# Patient Record
Sex: FEMALE | Race: WHITE | NOT HISPANIC OR LATINO | Employment: UNEMPLOYED | URBAN - METROPOLITAN AREA
[De-identification: names, ages, dates, MRNs, and addresses within clinical notes are randomized per-mention and may not be internally consistent; named-entity substitution may affect disease eponyms.]

---

## 2017-05-18 ENCOUNTER — OFFICE VISIT (OUTPATIENT)
Dept: NEUROLOGY | Facility: MEDICAL CENTER | Age: 57
End: 2017-05-18
Payer: MEDICAID

## 2017-05-18 VITALS
WEIGHT: 120 LBS | OXYGEN SATURATION: 98 % | BODY MASS INDEX: 20.49 KG/M2 | HEIGHT: 64 IN | RESPIRATION RATE: 16 BRPM | DIASTOLIC BLOOD PRESSURE: 82 MMHG | TEMPERATURE: 99 F | HEART RATE: 63 BPM | SYSTOLIC BLOOD PRESSURE: 120 MMHG

## 2017-05-18 DIAGNOSIS — R20.2 PARESTHESIAS: Primary | ICD-10-CM

## 2017-05-18 PROCEDURE — 99205 OFFICE O/P NEW HI 60 MIN: CPT | Performed by: PSYCHIATRY & NEUROLOGY

## 2017-05-18 RX ORDER — DICYCLOMINE HYDROCHLORIDE 10 MG/1
10 CAPSULE ORAL
COMMUNITY
End: 2017-07-07

## 2017-05-18 RX ORDER — PREGABALIN 50 MG/1
50 CAPSULE ORAL 3 TIMES DAILY
COMMUNITY
End: 2017-05-18

## 2017-05-18 RX ORDER — PANTOPRAZOLE SODIUM 40 MG/1
40 TABLET, DELAYED RELEASE ORAL DAILY
COMMUNITY
End: 2017-07-07

## 2017-05-18 RX ORDER — BACLOFEN 10 MG/1
10 TABLET ORAL 3 TIMES DAILY
COMMUNITY
End: 2017-07-07

## 2017-05-18 ASSESSMENT — ENCOUNTER SYMPTOMS
WEAKNESS: 1
BACK PAIN: 1
TINGLING: 1
SENSORY CHANGE: 1
FOCAL WEAKNESS: 0
TREMORS: 1
HEADACHES: 0
NECK PAIN: 1
CONSTIPATION: 0
MYALGIAS: 0

## 2017-05-18 NOTE — MR AVS SNAPSHOT
"        Nadine Eckert   2017 8:40 AM   Office Visit   MRN: [id number]    Department:  [facility]   Dept Phone:  [phone]    Description:  Female : 1960   Provider:  Anthony Tyler M.D.           Reason for Visit     New Patient New patient. Ref by [facility]. Pt complains of light dizziness, rapid weight loss, body aches, numbness and tingling in bilateral extremities. Exposure to pesticide chemicals at work. Pt states that she smokes marijuana to alleviate pain.      Allergies as of 2017     Allergen Noted Reactions    Asa [Aspirin] 2017   Nausea      You were diagnosed with     Paresthesias   [294509]  -  Primary       Vital Signs     Blood Pressure Pulse Temperature Respirations Height Weight    120/82 mmHg 63 37.2 °C (99 °F) 16 1.626 m (5' 4\") 54.432 kg (120 lb)    Body Mass Index Oxygen Saturation                20.59 kg/m2 98%          Basic Information     Date Of Birth Sex Race Ethnicity Preferred Language    1960 Female White Non- English      Problem List              ICD-10-CM Priority Class Noted - Resolved    Paresthesias R20.2   2017 - Present      Health Maintenance     Patient has no pending health maintenance at this time      Current Immunizations     No immunizations on file.      Below and/or attached are the medications your provider expects you to take. Review all of your home medications and newly ordered medications with your provider and/or pharmacist. Follow medication instructions as directed by your provider and/or pharmacist. Please keep your medication list with you and share with your provider. Update the information when medications are discontinued, doses are changed, or new medications (including over-the-counter products) are added; and carry medication information at all times in the event of emergency situations     Allergies:  ASA - Nausea               Medications  Valid as of: May 18, 2017 -  9:59 AM   " Generic Name Brand Name Tablet Size Instructions for use    Baclofen (Tab) LIORESAL 10 MG Take 10 mg by mouth 3 times a day.        Dicyclomine HCl (Cap) BENTYL 10 MG Take 10 mg by mouth 4 Times a Day,Before Meals and at Bedtime.        LamoTRIgine (Kit) LamoTRIgine 50 & 100 & 200 MG Take 1 Tab by mouth every day. Take as directed in titration kit, one tab daily, but increasing the dose every week.        Pantoprazole Sodium (Tablet Delayed Response) PROTONIX 40 MG Take 40 mg by mouth every day.        .                 Medicines prescribed today were sent to:     [id number] - [place], [place] - [address]    [address] [place] [address]    Phone: [phone] Fax: [phone]    Open 24 Hours?: No      Medication refill instructions:       If your prescription bottle indicates you have medication refills left, it is not necessary to call your provider’s office. Please contact your pharmacy and they will refill your medication.    If your prescription bottle indicates you do not have any refills left, you may request refills at any time through one of the following ways: The online [employer] system (except Urgent Care), by calling your provider’s office, or by asking your pharmacy to contact your provider’s office with a refill request. Medication refills are processed only during regular business hours and may not be available until the next business day. Your provider may request additional information or to have a follow-up visit with you prior to refilling your medication.   *Please Note: Medication refills are assigned a new Rx number when refilled electronically. Your pharmacy may indicate that no refills were authorized even though a new prescription for the same medication is available at the pharmacy. Please request the medicine by name with the pharmacy before contacting your provider for a refill.        Your To Do List     Future Labs/Procedures Complete By Expires    CRYOGLOBULIN QL SERUM RFLX   As directed 5/18/2018    FOLATE  As directed 5/18/2018    HEAVY METALS BLOOD  As directed 5/18/2018    SPEP W/REFLEX TO HOA, A, G, M  As directed 5/18/2018    TSH  As directed 5/18/2018    VITAMIN B12  As directed 5/18/2018    WESTERGREN SED RATE  As directed 11/16/2017      Referral     A referral request has been sent to our patient care coordination department. Please allow 3-5 business days for us to process this request and contact you either by phone or mail. If you do not hear from us by the 5th business day, please call us at [phone].           MyChart Status: Patient Declined

## 2017-05-19 NOTE — PROGRESS NOTES
Subjective:      Nadine Eckert is a 56 y.o. female who presents from the office of Natalie Starkey PA-C, for consultation, with a history of bilateral paresthesias, numbness and painful dysesthesias.     HPI    Ms. Eckert is a pleasant 56-year-old right-handed  female who first began to suffer from very painful dysesthesias around the mid thoracic region radiating around the left flank under the rib cage. Starting about 2 years ago, there was no inciting event, she denied any rash in the associated area. With time the pain would then begin to radiate up the thoracic spinal cord about several inches. It was constant, skin hypersensitivity began to develop as well. There were no symptoms in a mirror-type distribution on the right side. At about the time the symptoms started, she was hospitalized, a pacemaker was placed because of sinus bradycardia, so MRI imaging could never be done. More recently CT scan of the spine was done, evidently it revealed minimal degenerative arthritis only. She was placed on Lyrica but this has caused significant side effects, and at the present dosing there has been no improvement with her pain and skin hypersensitivity.    During this time she had also begun to suffer from numbness and tingling sensations which started in the feet, and which have subsequently ascended. These were very mild, there was no change in her back and flank pain symptoms, they did not seem to fluctuate depending on weight bearing, sedentary or supine position, and there was never weakness. She denied allodynia or lower extremity edema. The symptoms were so mild she actually was able to function without real discomfort and she paid the symptoms little attention. About 5-6 months ago, for unclear reasons, the symptoms did become more noticeable, and since then they have ascended, now up to the hips, and though she denies any perineal or perianal sensory changes, there has been no lower back pain with  radicular, radiating symptoms distally. Bladder function are stable. Again the symptoms are constant, they can be quite unpleasant, she denies any cramping sensations or actual weakness. Balance has become quite problematic as a result. She denies any changes with the instability depending on ambient light or lack thereof, walking uneven surfaces is no worse than smooth surfaces. There is no loss of sensation in the feet. The hands became involved eventually, again she describes a constant numbness in a glove-distribution to the wrists. There has been loss of dexterity, but there was no loss of sensation. The symptoms in the hands are not worsened upon awakening, they do not seem to worsen if she uses the hands.    About one month prior to onset of the left back and flank pain symptoms, she had had about one month of exposure to several organic solvents in a very enclosed space. These exposures whenever repeated.    She does describe tremor that exists in the head and neck, she often has an internalized sense of tremulousness though there has been no involvement of the voice. She notes this when she is seated or standing erect. The hands are not involved. This predated all of the sensory symptoms.    She has a history of plaque psoriasis, no history of CVA, CAD, PVD, MS, neurodegenerative disease, neuropathic disease, malignancy, thyroid disease, diabetes, autoimmune disease, liver or kidney disease or blood dyscrasia. She did get chickenpox as a child, she has not received the immunization. She had a pacemaker placed 2 years ago for sinus bradycardia. Her mother had heart disease and hypertension, her father is still alive and well, she has 6 siblings, one brother with diabetes, alcohol abuse and stroke, another brother passed away from cancer. She is not aware of anyone in the family with a history of similar symptoms, MS or neurodegenerative disease. She has a history of heavy alcohol overuse, she has been clean  "for 16 years, she continues to smoke regularly.    She is presently on Lyrica, Protonix and baclofen.    Review of Systems   Constitutional: Positive for malaise/fatigue.   Gastrointestinal: Negative for constipation.   Genitourinary: Negative for urgency and frequency.   Musculoskeletal: Positive for back pain and neck pain. Negative for myalgias.   Neurological: Positive for tingling, tremors, sensory change and weakness. Negative for focal weakness and headaches.        Objective:     /82 mmHg  Pulse 63  Temp(Src) 37.2 °C (99 °F)  Resp 16  Ht 1.626 m (5' 4\")  Wt 54.432 kg (120 lb)  BMI 20.59 kg/m2  SpO2 98%     Physical Exam    She appears in no acute distress. Her vital signs are stable. There is no malar rash, jaw or temporal tenderness, or jaw claudication. Her neck is supple, range of motion is full, carotid pulses are present bilaterally without asymmetry or bruits. Lhermitte phenomena is absent, compression maneuvers are negative. There is no spasm or tenderness of the cervical paraspinal muscle bodies. Spinous processes are nontender. Cardiac evaluation reveals a regular rhythm. Straight leg raising is negative bilaterally. There is some darkened, reddened discoloration in the distal lower extremities, distal pulses are present but diminished. There is markedly tenderness of the elbows medially and at the wrists, all bilateral. Compression maneuvers at these points elicited significant and distal radiating paresthesias in both ulnar and median nerve distributions, respectively. Phalen sign is present bilaterally. Around the thorax, there is no evidence of skin change in color, texture or turgor.    Mentation is intact. There is no evidence of aphasia, apraxia, inattention, she is fully oriented.    PERRLA/EOMI, visual fields are full, there is no facial asymmetry, sensory loss, or bulbar dysfunction. The tongue and uvular midline, jaw jerk is absent, shoulder shrug is symmetric. There is a " fine side to side tremulousness of the head when she is otherwise distracted and sitting erect. There is no involvement of the voice.    Motor exam reveals normal tone throughout, there is no tremulousness of the hands either at rest or with sustained posture. Strength is fully intact in all 4 extremities. There is no evidence of atrophy. Reflexes are easily elicited throughout, the right ankle jerk is absent when compared to the left which is easily elicited.    She walks with an antalgic quality, station is not widened. She can get up on her heels and toes but walking is limited because of pain. Repetitive movements with the feet are slowed but symmetric. These are intact with the hands. There is no appendicular dystaxia throughout.    Sensory exam does reveal a mild stocking loss of vibration to the ankles, pinprick temperature and joint position sense are intact throughout. Around the left flank there is no real loss of pin, temperature or light touch perception on the left side in the areas described by the patient where her pain is located.     Assessment/Plan:     1. Paresthesias  She describes a complex collection of locations were paresthesias. I suspect the thoracic region is an amyotrophy possibly related to a varicella-zoster without rash. Unfortunately, MRI cannot be done because of her pacemaker. She is not overtly myelopathic on exam. CT imaging of the spine evidently was unremarkable but again this modality is very insensitive. Evidently, there is no indication of some more significant infiltrative process.    The paresthesias in the extremities suggest a small fiber, painful sensory neuropathic process, and though she has a history of alcohol overuse, she quit more than a decade prior to the symptoms starting. She lacks a history of typical risk otherwise. Whatever these organic solvents were that she was exposed to, it's possible they may be at the root cause, but neurophysiologic studies should be  done for thoroughness. Blood work as well should be done to rule out other, medical causes of a painful sensory neuropathy. This is not likely hereditary absent any family history.    Interestingly, she has exquisite tenderness at the elbows and wrists, and I suspect we are also dealing with compression neuropathies in the upper extremities, and these may be the real reason for the hand symptoms. In the legs, she has a dropped ankle jerk, so I suspect we are seeing a radiculopathy on the right side, possibly S1 level. Still, this won't explain the left leg symptoms nor the distribution of the sensory distortions overall. Obviously a peripheral polyneuropathy will predispose to compression neuropathic processes.    Face-to-face time was spent reviewing all of the above with the patient. Rationale for blood work, neurophysiologic studies being done, etc. was reviewed. Lyrica will be discontinued, Lamictal ER titration will be started beginning at 50 mg daily for one week, then 100 mg daily, eventually 200 mg daily. Side effects were reviewed. Referral to hand therapy for splinting and reinjury prevention also is being set up. Follow-up will be scheduled in the next several months.    - LamoTRIgine 50 & 100 & 200 MG Kit; Take 1 Tab by mouth every day. Take as directed in titration kit, one tab daily, but increasing the dose every week.  Dispense: 1 Kit; Refill: 0  - WESTERGREN SED RATE; Future  - C-REACTIVE PROTEIN CARDIAC  - FOLATE; Future  - VITAMIN B12; Future  - SPEP W/REFLEX TO HOA, A, G, M; Future  - HEAVY METALS BLOOD; Future  - CRYOGLOBULIN QL SERUM RFLX; Future  - REFERRAL TO NEURODIAGNOSTICS (EEG,EP,EMG/NCS/DBS) Modality Requested: EMG/NCS-Comment Extremities  - TSH; Future  - REFERRAL TO OCCUPATIONAL THERAPY Reason for Therapy: Eval/Treat/Report    2. Essential tremor  An incidental issue, this was brought up as part of the full evaluation. This is independent of the neuropathy itself, if one exists,  certainly not related to any type of exposure that she may have suffered from. It is mild enough that we simply can observe. We spent some time talking about this condition as well, and how it differs from Parkinson's disease.    Time: Evaluation 60 minutes for exam come review, discussion, and education  Discussion: As mentioned in the assessment, over 50% of the time spent face-to-face counseling and coordinating care

## 2017-05-23 LAB — CRP SERPL HS-MCNC: 0.32 MG/L (ref 0–3)

## 2017-05-29 LAB
ALBUMIN SERPL ELPH-MCNC: 3.8 G/DL (ref 2.9–4.4)
ALBUMIN/GLOB SERPL: 1.4 {RATIO} (ref 0.7–1.7)
ALPHA1 GLOB SERPL ELPH-MCNC: 0.3 G/DL (ref 0–0.4)
ALPHA2 GLOB SERPL ELPH-MCNC: 0.9 G/DL (ref 0.4–1)
ARSENIC BLD-MCNC: 7 UG/L (ref 2–23)
B-GLOBULIN SERPL ELPH-MCNC: 1 G/DL (ref 0.7–1.3)
CRYOGLOB SER QL 1D COLD INC: NORMAL
ERYTHROCYTE [SEDIMENTATION RATE] IN BLOOD BY WESTERGREN METHOD: 3 MM/HR (ref 0–40)
FOLATE SERPL-MCNC: 11.5 NG/ML
GAMMA GLOB SERPL ELPH-MCNC: 0.6 G/DL (ref 0.4–1.8)
GLOBULIN SER CALC-MCNC: 2.8 G/DL (ref 2.2–3.9)
LEAD BLD-MCNC: 2 UG/DL (ref 0–19)
M PROTEIN SERPL ELPH-MCNC: NORMAL G/DL
MERCURY BLD-MCNC: NORMAL UG/L (ref 0–14.9)
PLEASE NOTE 011150: NORMAL
PROT SERPL-MCNC: 6.6 G/DL (ref 6–8.5)
TSH SERPL DL<=0.005 MIU/L-ACNC: 1.23 UIU/ML (ref 0.45–4.5)
VIT B12 SERPL-MCNC: 531 PG/ML (ref 211–946)

## 2017-06-14 ENCOUNTER — TELEPHONE (OUTPATIENT)
Dept: NEUROLOGY | Facility: MEDICAL CENTER | Age: 57
End: 2017-06-14

## 2017-06-15 NOTE — TELEPHONE ENCOUNTER
Pt called today to state that she is feeling better, but is now experiencing severe cramping and left sided back pain. Please advise. Thanks!  - CR      508pm

## 2017-06-22 ENCOUNTER — TELEPHONE (OUTPATIENT)
Dept: NEUROLOGY | Facility: MEDICAL CENTER | Age: 57
End: 2017-06-22

## 2017-06-22 NOTE — TELEPHONE ENCOUNTER
Pt is sending this message via [employer] e-mail. Please advise. Thank you.    Non-Urgent Medical Question        From     Nadine Eckert      To     [facility]      Sent     6/22/2017  2:50 PM            My 7 year old granddaughter is at Urgent Care covered in Chicken Pox. Did I cause this. ? Am I contagious? I'm around children and senior a lot.Please let me know.Thank You so much.

## 2017-06-26 NOTE — TELEPHONE ENCOUNTER
Message  Received: 4 days ago       Anthony Tyler M.D.  Carlee Barcenas, [facility]       Caller: Unspecified (4 days ago, 4:12 PM)                     Please tell the patient she did not give her granddaughter chickenpox. In fact, I would be more concerned that her granddaughter would give it to her, especially if she has not had any exposure or infection when she was a child.

## 2017-07-07 ENCOUNTER — OFFICE VISIT (OUTPATIENT)
Dept: NEUROLOGY | Facility: MEDICAL CENTER | Age: 57
End: 2017-07-07
Payer: MEDICAID

## 2017-07-07 VITALS
DIASTOLIC BLOOD PRESSURE: 80 MMHG | SYSTOLIC BLOOD PRESSURE: 118 MMHG | BODY MASS INDEX: 19.67 KG/M2 | RESPIRATION RATE: 16 BRPM | WEIGHT: 115.2 LBS | TEMPERATURE: 97.8 F | HEART RATE: 60 BPM | OXYGEN SATURATION: 96 % | HEIGHT: 64 IN

## 2017-07-07 DIAGNOSIS — R20.2 PARESTHESIAS: ICD-10-CM

## 2017-07-07 PROCEDURE — 99214 OFFICE O/P EST MOD 30 MIN: CPT | Performed by: PSYCHIATRY & NEUROLOGY

## 2017-07-07 RX ORDER — LAMOTRIGINE 50 MG/1
TABLET, EXTENDED RELEASE ORAL
COMMUNITY
Start: 2017-05-19 | End: 2017-07-07

## 2017-07-07 RX ORDER — LAMOTRIGINE 200 MG/1
200 TABLET, EXTENDED RELEASE ORAL DAILY
Qty: 30 TAB | Refills: 6 | Status: SHIPPED | OUTPATIENT
Start: 2017-07-07 | End: 2017-09-20 | Stop reason: SDUPTHER

## 2017-07-07 ASSESSMENT — ENCOUNTER SYMPTOMS
HEADACHES: 0
TINGLING: 1
FALLS: 0
LOSS OF CONSCIOUSNESS: 0
SENSORY CHANGE: 1
INSOMNIA: 1
MEMORY LOSS: 0
SEIZURES: 0

## 2017-07-07 ASSESSMENT — PATIENT HEALTH QUESTIONNAIRE - PHQ9: CLINICAL INTERPRETATION OF PHQ2 SCORE: 0

## 2017-07-07 NOTE — MR AVS SNAPSHOT
"        Nadine Eckert   2017 4:00 PM   Office Visit   MRN: [id number]    Department:  [facility]   Dept Phone:  [phone]    Description:  Female : 1960   Provider:  Anthony Tyler M.D.           Reason for Visit     Follow-Up Parethesias      Allergies as of 2017     Allergen Noted Reactions    Asa [Aspirin] 2017   Nausea      You were diagnosed with     Paresthesias   [239253]         Vital Signs     Blood Pressure Pulse Temperature Respirations Height Weight    118/80 mmHg 60 36.6 °C (97.8 °F) 16 1.626 m (5' 4\") 52.254 kg (115 lb 3.2 oz)    Body Mass Index Oxygen Saturation Smoking Status             19.76 kg/m2 96% Current Every Day Smoker         Basic Information     Date Of Birth Sex Race Ethnicity Preferred Language    1960 Female White Non- English      Your appointments     2017  9:30 AM   ELECTROMYOGRAPHY with NEURODIAGNOSTIC EMG LAB   [facility] (--)    [address], [address] [id number] [phone]            Oct 25, 2017  9:40 AM   Follow Up Visit with Anthony Tyler M.D.   [facility] (--)    [address], [address] [id number] [phone]           You will be receiving a confirmation call a few days before your appointment from our automated call confirmation system.              Problem List              ICD-10-CM Priority Class Noted - Resolved    Paresthesias R20.2   2017 - Present      Health Maintenance        Date Due Completion Dates    IMM DTaP/Tdap/Td Vaccine (1 - Tdap) 10/23/1979 ---    PAP SMEAR 10/23/1981 ---    MAMMOGRAM 10/23/2000 ---    COLONOSCOPY 10/23/2010 ---    IMM INFLUENZA (1) 2017 ---            Current Immunizations     No immunizations on file.      Below and/or attached are the medications your provider expects you to take. Review all of your home medications and newly ordered medications with your provider and/or pharmacist. Follow " medication instructions as directed by your provider and/or pharmacist. Please keep your medication list with you and share with your provider. Update the information when medications are discontinued, doses are changed, or new medications (including over-the-counter products) are added; and carry medication information at all times in the event of emergency situations     Allergies:  ASA - Nausea               Medications  Valid as of: July 07, 2017 -  4:32 PM    Generic Name Brand Name Tablet Size Instructions for use    Baclofen (Tab) LIORESAL 10 MG Take 10 mg by mouth 3 times a day.        Dicyclomine HCl (Cap) BENTYL 10 MG Take 10 mg by mouth 4 Times a Day,Before Meals and at Bedtime.        LamoTRIgine (Kit) LamoTRIgine 50 & 100 & 200 MG Take 1 Tab by mouth every day. Take as directed in titration kit, one tab daily, but increasing the dose every week.        LamoTRIgine (TABLET SR 24 HR) LamoTRIgine 50 MG         LamoTRIgine (TABLET SR 24 HR) LamoTRIgine 200 MG Take 200 mg by mouth every day.        Pantoprazole Sodium (Tablet Delayed Response) PROTONIX 40 MG Take 40 mg by mouth every day.        Pregabalin (Cap) LYRICA 50 MG         .                 Medicines prescribed today were sent to:     [id number] - [place], [place] - [address]    [address] [facility] [address]    Phone: [phone] Fax: [phone]    Open 24 Hours?: No      Medication refill instructions:       If your prescription bottle indicates you have medication refills left, it is not necessary to call your provider’s office. Please contact your pharmacy and they will refill your medication.    If your prescription bottle indicates you do not have any refills left, you may request refills at any time through one of the following ways: The online [employer] system (except Urgent Care), by calling your provider’s office, or by asking your pharmacy to contact your provider’s office with a refill request. Medication refills are  processed only during regular business hours and may not be available until the next business day. Your provider may request additional information or to have a follow-up visit with you prior to refilling your medication.   *Please Note: Medication refills are assigned a new Rx number when refilled electronically. Your pharmacy may indicate that no refills were authorized even though a new prescription for the same medication is available at the pharmacy. Please request the medicine by name with the pharmacy before contacting your provider for a refill.           MyChart Access Code: Activation code not generated  Current MyChart Status: Active          Quit Tobacco Information     Do you want to quit using tobacco?    Quitting tobacco decreases risks of cancer, heart and lung disease, increases life expectancy, improves sense of taste and smell, and increases spending money, among other benefits.    If you are thinking about quitting, we can help.  • [facility] Quit Tobacco Program: 256.516.1626  o Program occurs weekly for four weeks and includes pharmacist consultation on products to support quitting smoking or chewing tobacco. A provider referral is needed for pharmacist consultation.  • Tobacco Users Help Hotline: 3-249-QUIT-NOW (884-5158) or https://[place].quitlogix.org/  o Free, confidential telephone and online coaching for [place] residents. Sessions are designed on a schedule that is convenient for you. Eligible clients receive free nicotine replacement therapy.  • Nationally: www.smokefree.gov  o Information and professional assistance to support both immediate and long-term needs as you become, and remain, a non-smoker. Smokefree.gov allows you to choose the help that best fits your needs.

## 2017-07-07 NOTE — PROGRESS NOTES
"Subjective:      Nadine Eckert is a 56 y.o. female who presents with her  Livan, for follow-up, with a history of painful dysesthesias, now it seems to be increasing both in intensity and geographic distribution.    HPI    Since last seen, the dysesthesias she had around the left flank have continued, she now has more of them in the lower extremities, ascending further, up the thoracic cage itself. Even the hands can become painful. With all this pain, she is not sleeping well, she is noticing generalized weakness and deconditioning, she is more tremulous, cognition is slowed. Serologies for autoimmune disease, thyroid dysfunction, B-12 and folate deficiencies, etc., were all normal/negative. EMG studies are scheduled in 2 weeks. Symptomatically, Lamictal  mg daily was well tolerated and provided very good symptomatic relief, unfortunately when she ran out of the medication, she never call the office to update. Needless to say, everything got worse, returning back to baseline.    Medical, surgical and family history is reviewed in the electronic health record, there are no new drug allergies. She has stopped her Protonix, Bentyl and baclofen.    Review of Systems   Constitutional: Positive for malaise/fatigue.   Musculoskeletal: Negative for falls.   Neurological: Positive for tingling and sensory change. Negative for seizures, loss of consciousness and headaches.   Psychiatric/Behavioral: Negative for memory loss. The patient has insomnia.    All other systems reviewed and are negative.       Objective:     /80 mmHg  Pulse 60  Temp(Src) 36.6 °C (97.8 °F)  Resp 16  Ht 1.626 m (5' 4\")  Wt 52.254 kg (115 lb 3.2 oz)  BMI 19.76 kg/m2  SpO2 96%     Physical Exam    She appears in some mild distress, especially when she is asked to move. Vital signs are stable. There is no malar rash, jaw temporal tenderness, or jaw claudication. The neck is supple, range of motion is full. Straight leg " "raising is negative bilaterally. There is no evidence of diffuse rash, bruising, or edema.    Full joints, there is no aphasia. PERRLA/EOMI, visual fields are full, there is no facial asymmetry, sensory loss, or bulbar dysfunction. The tongue and uvula are midline. Motor exam is intact, tone is normal throughout, strength is symmetric, reflexes are present at all points including the ankles bilaterally, both toes are downgoing. Repetitive movements are intact, there is no appendicular dystaxia. She walks with a cane, it is seemingly quite painful to do so. Sensory exam is intact to vibration and temperature.     Assessment/Plan:     1. Paresthesias  I'm not clear whether we will actually ever find a cause for the diffuse pain she is suffering from, but I suspect this may simply be a chronic pain syndrome such as fibromyalgia that is developing. Whether or not things were triggered by a thoracic root amyotrophy will never be known, but they were reassured that she does not have \"systemic chickenpox\" causing all of her pain symptoms. The differences between shingles and disseminated varicella-zoster were reviewed in full. She was reassured that what her condition is, as painful as it is, is still more benign and not necessarily progressive. It is certainly amenable to treatment, speaking of which, we will reinitiate lamotrigine  mg daily and she will continue that for the foreseeable future. We will follow-up after the neurophysiologic studies done. She was told that so far most of the more malignant and organic illnesses that can result in her symptoms, but also more likely to be associated with permanent deficits, have for the most part been ruled out.    - LamoTRIgine 200 MG TABLET SR 24 HR; Take 200 mg by mouth every day.  Dispense: 30 Tab; Refill: 6    Time: Evaluation of 25 minutes for exam come review, discussion, and education  Discussion: As mentioned in assessment, over 50% of the time spent " face-to-face counseling and coordination in care

## 2017-07-21 ENCOUNTER — NON-PROVIDER VISIT (OUTPATIENT)
Dept: NEUROLOGY | Facility: MEDICAL CENTER | Age: 57
End: 2017-07-21
Payer: MEDICAID

## 2017-07-21 DIAGNOSIS — R20.2 PARESTHESIAS: ICD-10-CM

## 2017-07-21 PROCEDURE — 95910 NRV CNDJ TEST 7-8 STUDIES: CPT

## 2017-07-21 PROCEDURE — 95886 MUSC TEST DONE W/N TEST COMP: CPT

## 2017-07-21 PROCEDURE — 95885 MUSC TST DONE W/NERV TST LIM: CPT | Mod: 59

## 2017-07-21 NOTE — PROGRESS NOTES
EMG and nerve conduction studies were requested in both upper extremities and lower extremity for paresthesias in the upper extremity and paresthesias in the lower extremity with occasional cramping pain in the lower extremities. The full report was scanned into the Epic system where the waveforms are stored. In the upper extremities and the median and ulnar conduction study on the right and the median and ulnar conduction study on the left were entirely normal normal distal latencies and no evidence of entrapment at the wrist or at the elbow. In the left lower extremity left sural conduction study is normal left common peroneal and left tibial conduction studies are normal. Needle exam was done of the right upper extremity selected muscles in the left lower extremity. In the right upper extremity pronator teres biceps triceps deltoid and the abductor digiti minimi had normal insertional activity and normal motor units. In the right lower extremity abductor hallucis anterior tib and gastroc were normal in terms of insertional activity and normal motor units.  Impression    This is essentially a normal nerve conduction study in the upper and lower extremities without evidence of neuropathy and no evidence of radiculopathy in the upper extremities. Clinical correlation suggested.

## 2017-07-21 NOTE — MR AVS SNAPSHOT
Nadine MEEK Babar   2017 9:30 AM   Non-Provider Visit   MRN: [id number]    Department:  [facility]   Dept Phone:  [phone]    Description:  Female : 1960   Provider:  [facility]           Reason for Visit     Procedure           Allergies as of 2017     Allergen Noted Reactions    Asa [Aspirin] 2017   Nausea      You were diagnosed with     Paresthesias   [[id number]]         Vital Signs     Smoking Status                   Current Every Day Smoker           Basic Information     Date Of Birth Sex Race Ethnicity Preferred Language    1960 Female White Non- English      Your appointments     Oct 25, 2017  9:40 AM   Follow Up Visit with Anthony Tyler M.D.   [facility] (--)    [address], [address]  [facility] [address] [phone]           You will be receiving a confirmation call a few days before your appointment from our automated call confirmation system.              Problem List              ICD-10-CM Priority Class Noted - Resolved    Paresthesias R20.2   2017 - Present      Health Maintenance        Date Due Completion Dates    IMM DTaP/Tdap/Td Vaccine (1 - Tdap) 10/23/1979 ---    PAP SMEAR 10/23/1981 ---    MAMMOGRAM 10/23/2000 ---    COLONOSCOPY 10/23/2010 ---    IMM INFLUENZA (1) 2017 ---            Current Immunizations     No immunizations on file.      Below and/or attached are the medications your provider expects you to take. Review all of your home medications and newly ordered medications with your provider and/or pharmacist. Follow medication instructions as directed by your provider and/or pharmacist. Please keep your medication list with you and share with your provider. Update the information when medications are discontinued, doses are changed, or new medications (including over-the-counter products) are added; and carry medication information at all times in the event of emergency  situations     Allergies:  ASA - Nausea               Medications  Valid as of: July 21, 2017 - 12:41 PM    Generic Name Brand Name Tablet Size Instructions for use    LamoTRIgine (TABLET SR 24 HR) LamoTRIgine 200 MG Take 200 mg by mouth every day.        .                 Medicines prescribed today were sent to:     [facility] #[id number] - [place] - [address]    [address] [place] [address]    Phone: [phone] Fax: [phone]    Open 24 Hours?: No      Medication refill instructions:       If your prescription bottle indicates you have medication refills left, it is not necessary to call your provider’s office. Please contact your pharmacy and they will refill your medication.    If your prescription bottle indicates you do not have any refills left, you may request refills at any time through one of the following ways: The online Hubblr system (except Urgent Care), by calling your provider’s office, or by asking your pharmacy to contact your provider’s office with a refill request. Medication refills are processed only during regular business hours and may not be available until the next business day. Your provider may request additional information or to have a follow-up visit with you prior to refilling your medication.   *Please Note: Medication refills are assigned a new Rx number when refilled electronically. Your pharmacy may indicate that no refills were authorized even though a new prescription for the same medication is available at the pharmacy. Please request the medicine by name with the pharmacy before contacting your provider for a refill.           Hubblr Access Code: Activation code not generated  Current Hubblr Status: Active          Quit Tobacco Information     Do you want to quit using tobacco?    Quitting tobacco decreases risks of cancer, heart and lung disease, increases life expectancy, improves sense of taste and smell, and increases spending money, among other  benefits.    If you are thinking about quitting, we can help.  • [facility] Quit Tobacco Program: 318-049-3925  o Program occurs weekly for four weeks and includes pharmacist consultation on products to support quitting smoking or chewing tobacco. A provider referral is needed for pharmacist consultation.  • Tobacco Users Help Hotline: 1-800-QUIT-NOW (121-6820) or https://[place].quitlogix.org/  o Free, confidential telephone and online coaching for [place] residents. Sessions are designed on a schedule that is convenient for you. Eligible clients receive free nicotine replacement therapy.  • Nationally: www.smokefree.gov  o Information and professional assistance to support both immediate and long-term needs as you become, and remain, a non-smoker. Smokefree.gov allows you to choose the help that best fits your needs.

## 2017-09-20 DIAGNOSIS — R20.2 PARESTHESIAS: ICD-10-CM

## 2017-09-20 RX ORDER — LAMOTRIGINE 200 MG/1
200 TABLET, EXTENDED RELEASE ORAL DAILY
Qty: 30 TAB | Refills: 6 | Status: SHIPPED | OUTPATIENT
Start: 2017-09-20 | End: 2017-10-25 | Stop reason: SINTOL

## 2017-10-25 ENCOUNTER — OFFICE VISIT (OUTPATIENT)
Dept: NEUROLOGY | Facility: MEDICAL CENTER | Age: 57
End: 2017-10-25
Payer: MEDICAID

## 2017-10-25 VITALS
OXYGEN SATURATION: 63 % | WEIGHT: 113.5 LBS | RESPIRATION RATE: 14 BRPM | HEART RATE: 97 BPM | BODY MASS INDEX: 19.38 KG/M2 | TEMPERATURE: 98.4 F | HEIGHT: 64 IN | DIASTOLIC BLOOD PRESSURE: 60 MMHG | SYSTOLIC BLOOD PRESSURE: 110 MMHG

## 2017-10-25 DIAGNOSIS — R20.2 PARESTHESIAS: ICD-10-CM

## 2017-10-25 DIAGNOSIS — G89.4 CHRONIC PAIN SYNDROME: Primary | ICD-10-CM

## 2017-10-25 PROCEDURE — 99214 OFFICE O/P EST MOD 30 MIN: CPT | Performed by: PSYCHIATRY & NEUROLOGY

## 2017-10-25 ASSESSMENT — PAIN SCALES - GENERAL: PAINLEVEL: 6=MODERATE PAIN

## 2017-10-25 ASSESSMENT — ENCOUNTER SYMPTOMS
HEADACHES: 1
MEMORY LOSS: 1
WEIGHT LOSS: 1
TREMORS: 1

## 2017-10-25 NOTE — PROGRESS NOTES
"Subjective:      Nadine Eckert is a 57 y.o. female who presents with her  Livan, for follow-up, with a history of diffuse pain and left flank paresthesias.    HPI    Since last seen, we had gotten her back on lamotrigine 200 mg daily, and since that time she has noted increasing headaches, tremulousness in the hands which has become a bit embarrassing at times, diarrhea, and occasional word finding difficulties with slowed mental processing. She had some word finding difficulties prior to starting the drug, they simply have gotten much worse., In fact, she also notices a forgetfulness on top of all of this. She is still independent with her ADLs, drives safely, etc. Her pain symptoms have fluctuated but certainly have not improved remarkably. The left sided flank discomfort and paresthesias also persist, fluctuating, never resolving. With all of this, her appetite has diminished, she has also lost a few more pounds, weight she doesn't have to lose.    Her EMG/NCV studies were completely within normal limits, evaluating both upper and left lower extremities. Imaging studies and blood serologies have historically been normal/unremarkable.    Medical, surgical and family histories are reviewed in the electronic health record, there are no new drug allergies. There are no new medical diagnoses documented. She is on lamotrigine 200 mg daily.    Review of Systems   Constitutional: Positive for malaise/fatigue and weight loss.   Neurological: Positive for tremors and headaches.   Psychiatric/Behavioral: Positive for memory loss.   All other systems reviewed and are negative.       Objective:     /60   Pulse 97   Temp 36.9 °C (98.4 °F)   Resp 14   Ht 1.626 m (5' 4\")   Wt 51.5 kg (113 lb 8 oz)   SpO2 (!) 63%   BMI 19.48 kg/m²      Physical Exam    She appears in no acute distress. Her vital signs are stable, her depression rating scale is 0. There is no malar rash or temporal tenderness. The neck is " supple, range of motion is full. Cardiac evaluation was unremarkable. There is no distortion of the skin over the left lower flank. There is some mild hyperpathia. Otherwise, mental status, cranial nerve, motor, coordination and sensory evaluations are all intact. There is a fine tremulousness seen with the hands, pronounced when they are held against gravity and with use of the extremities, less so when she is at rest. Tone is normal. There is no bradykinesia. She walks with normal station, there is no appendicular dystaxia. Fine motor control is intact. Sensory exam is intact to vibration and temperature in all 4 extremities.     Assessment/Plan:     1. Paresthesias, chronic pain syndrome  For the most part, I have ruled out organic neurologic disease as a cause for her chronic pain. Referral to a pain specialist is now in order. A precise diagnosis may never be determined, but she needs to follow with people who have a greater breadth of experience in dealing with idiopathic chronic pain. I was at least able to tell she and Livan what this is not due to from a neurologic standpoint, something they were both quite happy to hear. At the same time, prognosis is going to be difficult to be made accurately. There is no need for additional neurologic follow-up at this time, no additional diagnostics are required. As for the other symptoms she is describing, including tremor, cognitive issues with word finding difficulty, loss of appetite, headache, etc., I suspect the lamotrigine is the real causative factor, thus the drug will be discontinued. These were never major symptoms prior to getting on drug, though chronic pain syndromes can have an effect on cognition, energy levels, headache frequencies, etc. She was quite happy to know that these symptoms at least can improve/resolve with this simple action. Thank you very much for allowing me to help participate in this pleasant lady's care.     - REFERRAL TO PAIN  CLINIC    Time: Evaluation of 25 minutes were exam, review, discussion, and education.  Discussion: As mentioned in the assessment, over 70% of the time spent face-to-face counseling and [place] care.